# Patient Record
Sex: MALE | Race: WHITE | Employment: FULL TIME | URBAN - METROPOLITAN AREA
[De-identification: names, ages, dates, MRNs, and addresses within clinical notes are randomized per-mention and may not be internally consistent; named-entity substitution may affect disease eponyms.]

---

## 2017-06-14 ENCOUNTER — OFFICE VISIT (OUTPATIENT)
Dept: FAMILY MEDICINE | Facility: CLINIC | Age: 52
End: 2017-06-14
Payer: COMMERCIAL

## 2017-06-14 VITALS
BODY MASS INDEX: 23.84 KG/M2 | TEMPERATURE: 97.8 F | RESPIRATION RATE: 16 BRPM | DIASTOLIC BLOOD PRESSURE: 62 MMHG | SYSTOLIC BLOOD PRESSURE: 128 MMHG | WEIGHT: 166.56 LBS | HEART RATE: 89 BPM | HEIGHT: 70 IN | OXYGEN SATURATION: 97 %

## 2017-06-14 DIAGNOSIS — G43.109 MIGRAINE WITH AURA AND WITHOUT STATUS MIGRAINOSUS, NOT INTRACTABLE: Primary | ICD-10-CM

## 2017-06-14 DIAGNOSIS — L71.9 ROSACEA: ICD-10-CM

## 2017-06-14 DIAGNOSIS — G47.9 SLEEP TROUBLE: ICD-10-CM

## 2017-06-14 PROCEDURE — 99214 OFFICE O/P EST MOD 30 MIN: CPT | Performed by: PHYSICIAN ASSISTANT

## 2017-06-14 RX ORDER — ZOLPIDEM TARTRATE 5 MG/1
5 TABLET ORAL
Qty: 14 TABLET | Refills: 0 | Status: SHIPPED | OUTPATIENT
Start: 2017-06-14 | End: 2017-09-19

## 2017-06-14 RX ORDER — MINOCYCLINE HYDROCHLORIDE 50 MG/1
50 CAPSULE ORAL 2 TIMES DAILY
Qty: 180 CAPSULE | Refills: 1 | Status: SHIPPED | OUTPATIENT
Start: 2017-06-14 | End: 2018-06-29

## 2017-06-14 RX ORDER — RIZATRIPTAN BENZOATE 10 MG/1
10 TABLET ORAL
Qty: 18 TABLET | Refills: 0 | Status: SHIPPED | OUTPATIENT
Start: 2017-06-14 | End: 2017-09-19

## 2017-06-14 NOTE — MR AVS SNAPSHOT
"              After Visit Summary   2017    Lalit Salinas    MRN: [id number]           Patient Information     Date Of Birth          1965        Visit Information        Provider Department      2017 2:30 PM Rosalva Mccall PA-C [facility] [place]        Today's Diagnoses     Migraine with aura and without status migrainosus, not intractable    -  1    Sleep trouble           Follow-ups after your visit        Who to contact     If you have questions or need follow up information about today's clinic visit or your schedule please contact [facility] directly at [phone].  Normal or non-critical lab and imaging results will be communicated to you by [employer], letter or phone within 4 business days after the clinic has received the results. If you do not hear from us within 7 days, please contact the clinic through [employer] or phone. If you have a critical or abnormal lab result, we will notify you by phone as soon as possible.  Submit refill requests through [employer] or call your pharmacy and they will forward the refill request to us. Please allow 3 business days for your refill to be completed.          Additional Information About Your Visit        MyChart Information     [employer] lets you send messages to your doctor, view your test results, renew your prescriptions, schedule appointments and more. To sign up, go to [email or web address] . Click on \"Log in\" on the left side of the screen, which will take you to the Welcome page. Then click on \"Sign up Now\" on the right side of the page.     You will be asked to enter the access code listed below, as well as some personal information. Please follow the directions to create your username and password.     Your access code is: [email or web address]  Expires: 2017  2:46 PM     Your access code will  in 90 days. If you need help or a new code, please call your [facility] or [phone].        Care " "EveryWhere ID     This is your Care EveryWhere ID. This could be used by other organizations to access your [facility] medical records  [id number]        Your Vitals Were     Pulse Temperature Respirations Height Pulse Oximetry BMI (Body Mass Index)    89 97.8  F (36.6  C) (Tympanic) 16 5' 9.5\" (1.765 m) 97% 24.24 kg/m2       Blood Pressure from Last 3 Encounters:   06/14/17 128/62   05/06/16 116/80   04/11/16 110/74    Weight from Last 3 Encounters:   06/14/17 166 lb 9 oz (75.6 kg)   05/06/16 167 lb 14.4 oz (76.2 kg)   04/11/16 166 lb 12.8 oz (75.7 kg)              We Performed the Following     MIGRAINE ACTION PLAN          Today's Medication Changes          These changes are accurate as of: 6/14/17  2:46 PM.  If you have any questions, ask your nurse or doctor.               Start taking these medicines.        Dose/Directions    rizatriptan 10 MG tablet   Commonly known as:  MAXALT   Used for:  Migraine with aura and without status migrainosus, not intractable   Started by:  Rosalva Mccall PA-C        Dose:  10 mg   Take 1 tablet (10 mg) by mouth at onset of headache for migraine May repeat in 2 hours. Max 3 tablets/24 hours.   Quantity:  18 tablet   Refills:  0       zolpidem 5 MG tablet   Commonly known as:  AMBIEN   Used for:  Sleep trouble   Started by:  Rosalva Mccall PA-C        Dose:  5 mg   Take 1 tablet (5 mg) by mouth nightly as needed for sleep   Quantity:  14 tablet   Refills:  0         Stop taking these medicines if you haven't already. Please contact your care team if you have questions.     SUMAtriptan 50 MG tablet   Commonly known as:  IMITREX   Stopped by:  Rosalva Mccall PA-C                Where to get your medicines      These medications were sent to [facility] [address] [place], [place] - [address] [facility] AT [facility] [address] & [facility]  [id number] [place] [address] [facility] [address]     Phone:  [phone]     rizatriptan 10 MG tablet       "   Some of these will need a paper prescription and others can be bought over the counter.  Ask your nurse if you have questions.     Bring a paper prescription for each of these medications     zolpidem 5 MG tablet                Primary Care Provider Office Phone # Fax #    Jas Palacios PA-C [phone]       [facility] [address]  [facility] [address]        Thank you!     Thank you for choosing [facility]  for your care. Our goal is always to provide you with excellent care. Hearing back from our patients is one way we can continue to improve our services. Please take a few minutes to complete the written survey that you may receive in the mail after your visit with us. Thank you!             Your Updated Medication List - Protect others around you: Learn how to safely use, store and throw away your medicines at www.disposemymeds.org.          This list is accurate as of: 6/14/17  2:46 PM.  Always use your most recent med list.                   Brand Name Dispense Instructions for use    minocycline 50 MG capsule    MINOCIN/DYNACIN    180 capsule    Take 1 capsule (50 mg) by mouth 2 times daily       rizatriptan 10 MG tablet    MAXALT    18 tablet    Take 1 tablet (10 mg) by mouth at onset of headache for migraine May repeat in 2 hours. Max 3 tablets/24 hours.       zolpidem 5 MG tablet    AMBIEN    14 tablet    Take 1 tablet (5 mg) by mouth nightly as needed for sleep

## 2017-06-14 NOTE — PROGRESS NOTES
SUBJECTIVE:                                                    Lalit Salinas is a 51 year old male who presents to clinic today for the following health issues:      Migraine     Headaches symptoms:  Worsened     Frequency: 1-2 Times Per Month     Duration of headaches: 3-4 Days    Able to do normal daily activities/work with migraines: Yes    Rescue/Relief medication:Imitrex              Effectiveness: no relief    Preventative medication: None    Neurologic complications: Blurred Vision Left Side    In the past 4 weeks, how often have you gone to Urgent Care or the emergency room because of your headaches?  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  Additional history: as documented    Patient Active Problem List   Diagnosis     CARDIOVASCULAR SCREENING; LDL GOAL LESS THAN 160     Migraine with aura and without status migrainosus, not intractable     Rosacea     History reviewed. No pertinent surgical history.    Social History   Substance Use Topics     Smoking status: Former Smoker     Smokeless tobacco: Never Used     Alcohol use Yes      Comment: occ     Family History   Problem Relation Age of Onset     Hypertension Mother      Other Cancer Father          Current Outpatient Prescriptions   Medication Sig Dispense Refill     rizatriptan (MAXALT) 10 MG tablet Take 1 tablet (10 mg) by mouth at onset of headache for migraine May repeat in 2 hours. Max 3 tablets/24 hours. 18 tablet 0     zolpidem (AMBIEN) 5 MG tablet Take 1 tablet (5 mg) by mouth nightly as  "needed for sleep 14 tablet 0     minocycline (MINOCIN/DYNACIN) 50 MG capsule Take 1 capsule (50 mg) by mouth 2 times daily 180 capsule 1     No Known Allergies    Reviewed and updated as needed this visit by clinical staff       Reviewed and updated as needed this visit by Provider         ROS:  Constitutional, HEENT, cardiovascular, pulmonary, gi and gu systems are negative, except as otherwise noted.    OBJECTIVE:                                                    /62 (BP Location: Right arm, Patient Position: Chair, Cuff Size: Adult Large)  Pulse 89  Temp 97.8  F (36.6  C) (Tympanic)  Resp 16  Ht 5' 9.5\" (1.765 m)  Wt 166 lb 9 oz (75.6 kg)  SpO2 97%  BMI 24.24 kg/m2  Body mass index is 24.24 kg/(m^2).  GENERAL: healthy, alert and no distress  EYES: Eyes grossly normal to inspection, PERRL and conjunctivae and sclerae normal  HENT: ear canals and TM's normal, nose and mouth without ulcers or lesions  NECK: no adenopathy, no asymmetry, masses, or scars and thyroid normal to palpation  RESP: lungs clear to auscultation - no rales, rhonchi or wheezes  CV: regular rate and rhythm, normal S1 S2, no S3 or S4, no murmur, click or rub, no peripheral edema and peripheral pulses strong  MS: no gross musculoskeletal defects noted, no edema    Diagnostic Test Results:  none      ASSESSMENT/PLAN:                                                            1. Migraine with aura and without status migrainosus, not intractable  Chronic issue, with recent headache, minimal improvement with Imitrex and has s/e to Imitrex. Will change to Maxalt to see if improves symptoms quicker with less s/e.  MAP given today. F/U if symptoms worsen or do not improve.  - MIGRAINE ACTION PLAN  - rizatriptan (MAXALT) 10 MG tablet; Take 1 tablet (10 mg) by mouth at onset of headache for migraine May repeat in 2 hours. Max 3 tablets/24 hours.  Dispense: 18 tablet; Refill: 0    2. Sleep trouble  New concern, since he thinks the lack of " sleep may trigger migraines, a few Ambien given to help him transition sleep.  - zolpidem (AMBIEN) 5 MG tablet; Take 1 tablet (5 mg) by mouth nightly as needed for sleep  Dispense: 14 tablet; Refill: 0    3. Rosacea  Chronic issue, refilled today.  - minocycline (MINOCIN/DYNACIN) 50 MG capsule; Take 1 capsule (50 mg) by mouth 2 times daily  Dispense: 180 capsule; Refill: 1    Risks, benefits and alternatives were discussed with patient. Agreeable to the plan of care.      Rosalva Mccall PA-C  [facility]

## 2017-06-14 NOTE — NURSING NOTE
"Chief Complaint   Patient presents with     Headache       Initial /62 (BP Location: Right arm, Patient Position: Chair, Cuff Size: Adult Large)  Pulse 89  Temp 97.8  F (36.6  C) (Tympanic)  Resp 16  Ht 5' 9.5\" (1.765 m)  Wt 166 lb 9 oz (75.6 kg)  SpO2 97%  BMI 24.24 kg/m2 Estimated body mass index is 24.24 kg/(m^2) as calculated from the following:    Height as of this encounter: 5' 9.5\" (1.765 m).    Weight as of this encounter: 166 lb 9 oz (75.6 kg).  Medication Reconciliation: complete     Patient would like to be notified at the following phone number for results from this visit   [phone] OK to leave message   Tyra Nunez CMA (AAMA) 6/14/2017 2:34 PM      "

## 2017-06-14 NOTE — LETTER
[facility]  [address] [facility] [address]  Phone: [phone]    June 14, 2017        Lalit Salinas  [address]  [facility] [address]          To whom it may concern:    RE: Lalit Salinas    Patient was seen and treated today at our clinic and missed work.    Please contact me for questions or concerns.      Sincerely,        Rosalva Mccall PA-C

## 2017-06-14 NOTE — LETTER
My Migraine Action Plan      Date: 6/14/2017     My Name: Lalit Salinas   YOB: 1965  My Pharmacy: [facility] [address] [facility] [address] [facility] AT [place] [address] & [facility]       My (Preventative) Control Medicine: none        My Rescue Medicine: Maxalt   My Doctor: Jas Palacios     My Clinic: [facility]  [address] [facility] [address] [phone]        GREEN ZONE = Good Control    My headache plan is working.   I can do what I need to do.           I WILL:     ? Keep managing my triggers.  ? Write in my migraine diary each time I have a headache.  ? Keep taking my preventive (controller) medicine daily.  ? Take my relief and rescue medicine as needed.             YELLOW ZONE = Not Enough Control    My headache plan isn t always working.   My headaches keep me from doing   some of the things I need to do.       I WILL:     ? Set goals to control my triggers and act on them.  ? Write in my migraine diary each time I have a headache and review it for                      patterns or new triggers.  ? Keep taking my preventive (controller) medicine daily.  ? Take my relief and rescue medicine as needed.  ? Call my doctor or clinic at if I stay in the Yellow Zone.             RED ZONE = Poor or No Control    My headache plan has  failed. I can t do anything  when I have one. My  medicines aren t working.           I WILL:   ? Set goals to control my triggers and act on them.  ? Write in my migraine diary each time I have a headache and review it for                      patterns or new triggers.  ? Keep taking my preventive (controller) medicine daily.  ? Take my relief and rescue medicine as needed.  ? Call my doctor or clinic or go to urgent care or an ER if I m having the worst                  headache of my life.  ? Call my doctor or clinic or go to urgent care or an ER if my medicine doesn t work.  ? Let my doctor or clinic  know within 2 weeks if I have gone to an urgent care or             emergency department.          Provider specific instructions:

## 2017-06-28 ENCOUNTER — OFFICE VISIT (OUTPATIENT)
Dept: FAMILY MEDICINE | Facility: CLINIC | Age: 52
End: 2017-06-28
Payer: COMMERCIAL

## 2017-06-28 VITALS
HEIGHT: 70 IN | DIASTOLIC BLOOD PRESSURE: 78 MMHG | HEART RATE: 73 BPM | RESPIRATION RATE: 18 BRPM | TEMPERATURE: 97.7 F | WEIGHT: 171.19 LBS | BODY MASS INDEX: 24.51 KG/M2 | SYSTOLIC BLOOD PRESSURE: 110 MMHG | OXYGEN SATURATION: 98 %

## 2017-06-28 DIAGNOSIS — G43.109 MIGRAINE WITH AURA AND WITHOUT STATUS MIGRAINOSUS, NOT INTRACTABLE: Primary | ICD-10-CM

## 2017-06-28 PROCEDURE — 99213 OFFICE O/P EST LOW 20 MIN: CPT | Performed by: PHYSICIAN ASSISTANT

## 2017-06-28 RX ORDER — ONDANSETRON 4 MG/1
4-8 TABLET, ORALLY DISINTEGRATING ORAL
Qty: 20 TABLET | Refills: 1 | Status: SHIPPED | OUTPATIENT
Start: 2017-06-28 | End: 2018-11-21

## 2017-06-28 NOTE — MR AVS SNAPSHOT
"              After Visit Summary   2017    Lalit Salinas    MRN: [id number]           Patient Information     Date Of Birth          1965        Visit Information        Provider Department      2017 2:50 PM Rosalva Mccall PA-C [facility]        Today's Diagnoses     Migraine with aura and without status migrainosus, not intractable    -  1       Follow-ups after your visit        Who to contact     If you have questions or need follow up information about today's clinic visit or your schedule please contact [facility] directly at [phone].  Normal or non-critical lab and imaging results will be communicated to you by Gust[employer], letter or phone within 4 business days after the clinic has received the results. If you do not hear from us within 7 days, please contact the clinic through [employer] or phone. If you have a critical or abnormal lab result, we will notify you by phone as soon as possible.  Submit refill requests through [employer] or call your pharmacy and they will forward the refill request to us. Please allow 3 business days for your refill to be completed.          Additional Information About Your Visit        MyChart Information     [employer] lets you send messages to your doctor, view your test results, renew your prescriptions, schedule appointments and more. To sign up, go to [email or web address] . Click on \"Log in\" on the left side of the screen, which will take you to the Welcome page. Then click on \"Sign up Now\" on the right side of the page.     You will be asked to enter the access code listed below, as well as some personal information. Please follow the directions to create your username and password.     Your access code is: [email or web address]  Expires: 2017  2:46 PM     Your access code will  in 90 days. If you need help or a new code, please call your [facility] or [phone].        Care EveryWhere ID     This is " "your Care EveryWhere ID. This could be used by other organizations to access your [facility] medical records  [id number]        Your Vitals Were     Pulse Temperature Respirations Height Pulse Oximetry BMI (Body Mass Index)    73 97.7  F (36.5  C) (Tympanic) 18 5' 9.5\" (1.765 m) 98% 24.92 kg/m2       Blood Pressure from Last 3 Encounters:   06/28/17 110/78   06/14/17 128/62   05/06/16 116/80    Weight from Last 3 Encounters:   06/28/17 171 lb 3 oz (77.7 kg)   06/14/17 166 lb 9 oz (75.6 kg)   05/06/16 167 lb 14.4 oz (76.2 kg)              Today, you had the following     No orders found for display         Today's Medication Changes          These changes are accurate as of: 6/28/17  3:02 PM.  If you have any questions, ask your nurse or doctor.               Start taking these medicines.        Dose/Directions    ondansetron 4 MG ODT tab   Commonly known as:  ZOFRAN ODT   Used for:  Migraine with aura and without status migrainosus, not intractable   Started by:  Rosalva Mccall PA-C        Dose:  4-8 mg   Take 1-2 tablets (4-8 mg) by mouth 3 times daily (before meals)   Quantity:  20 tablet   Refills:  1            Where to get your medicines      These medications were sent to [facility] [address] [facility] [address] [facility] AT [place] [address] & [facility]  [address] [facility] [address]     Phone:  [phone]     ondansetron 4 MG ODT tab                Primary Care Provider Office Phone # Fax #    Jas Palacios PA-C [phone]       [facility] [address]  [facility] [address]        Equal Access to Services     ELLEN GARRETT AH: Elaina prideo Sokateali, waaxda luqadaha, qaybta kaalmada adeegyada, jj hu. So [facility] [phone].    ATENCIÓN: Si habla español, tiene a monge disposición servicios gratuitos de asistencia lingüística. Llame al [phone].    We comply with applicable federal " civil rights laws and [place] laws. We do not discriminate on the basis of race, color, national origin, age, disability sex, sexual orientation or gender identity.            Thank you!     Thank you for choosing [facility] [place]  for your care. Our goal is always to provide you with excellent care. Hearing back from our patients is one way we can continue to improve our services. Please take a few minutes to complete the written survey that you may receive in the mail after your visit with us. Thank you!             Your Updated Medication List - Protect others around you: Learn how to safely use, store and throw away your medicines at www.disposemymeds.org.          This list is accurate as of: 6/28/17  3:02 PM.  Always use your most recent med list.                   Brand Name Dispense Instructions for use Diagnosis    minocycline 50 MG capsule    MINOCIN/DYNACIN    180 capsule    Take 1 capsule (50 mg) by mouth 2 times daily    Rosacea       ondansetron 4 MG ODT tab    ZOFRAN ODT    20 tablet    Take 1-2 tablets (4-8 mg) by mouth 3 times daily (before meals)    Migraine with aura and without status migrainosus, not intractable       rizatriptan 10 MG tablet    MAXALT    18 tablet    Take 1 tablet (10 mg) by mouth at onset of headache for migraine May repeat in 2 hours. Max 3 tablets/24 hours.    Migraine with aura and without status migrainosus, not intractable       zolpidem 5 MG tablet    AMBIEN    14 tablet    Take 1 tablet (5 mg) by mouth nightly as needed for sleep    Sleep trouble

## 2017-06-28 NOTE — PROGRESS NOTES
SUBJECTIVE:                                                    Lalit Salinas is a 51 year old male who presents to clinic today for the following health issues:    Pt needs FMLA paperwork completed.   Patient needs this filled out in regards to recent migraine headache that he was seen for 2 weeks ago, missed a few days of work.   Would also like this in place in case he misses future work for the migraines.  Notes that the Maxalt seems to work, but still has a lot of nausea with the headaches.    Problem list and histories reviewed & adjusted, as indicated.  Additional history: as documented    Patient Active Problem List   Diagnosis     CARDIOVASCULAR SCREENING; LDL GOAL LESS THAN 160     Migraine with aura and without status migrainosus, not intractable     Rosacea     History reviewed. No pertinent surgical history.    Social History   Substance Use Topics     Smoking status: Former Smoker     Smokeless tobacco: Never Used     Alcohol use Yes      Comment: occ     Family History   Problem Relation Age of Onset     Hypertension Mother      Other Cancer Father          Current Outpatient Prescriptions   Medication Sig Dispense Refill     ondansetron (ZOFRAN ODT) 4 MG ODT tab Take 1-2 tablets (4-8 mg) by mouth 3 times daily (before meals) 20 tablet 1     rizatriptan (MAXALT) 10 MG tablet Take 1 tablet (10 mg) by mouth at onset of headache for migraine May repeat in 2 hours. Max 3 tablets/24 hours. 18 tablet 0     zolpidem (AMBIEN) 5 MG tablet Take 1 tablet (5 mg) by mouth nightly as needed for sleep 14 tablet 0     minocycline (MINOCIN/DYNACIN) 50 MG capsule Take 1 capsule (50 mg) by mouth 2 times daily 180 capsule 1     No Known Allergies    Reviewed and updated as needed this visit by clinical staff       Reviewed and updated as needed this visit by Provider         ROS:  Constitutional, HEENT, cardiovascular, pulmonary, gi and gu systems are negative, except as otherwise noted.    OBJECTIVE:     /78  "(BP Location: Right arm, Patient Position: Chair, Cuff Size: Adult Regular)  Pulse 73  Temp 97.7  F (36.5  C) (Tympanic)  Resp 18  Ht 5' 9.5\" (1.765 m)  Wt 171 lb 3 oz (77.7 kg)  SpO2 98%  BMI 24.92 kg/m2  Body mass index is 24.92 kg/(m^2).  GENERAL: healthy, alert and no distress  NECK: no adenopathy, no asymmetry, masses, or scars and thyroid normal to palpation  RESP: lungs clear to auscultation - no rales, rhonchi or wheezes  CV: regular rate and rhythm, normal S1 S2, no S3 or S4, no murmur, click or rub, no peripheral edema and peripheral pulses strong  MS: no gross musculoskeletal defects noted, no edema    Diagnostic Test Results:  none     ASSESSMENT/PLAN:             1. Migraine with aura and without status migrainosus, not intractable  Chronic issue, will trial the Zofran for the nausea associated with the headaches.  Did fill out the [facility] paperwork, copied and scanned to chart.  - ondansetron (ZOFRAN ODT) 4 MG ODT tab; Take 1-2 tablets (4-8 mg) by mouth 3 times daily (before meals)  Dispense: 20 tablet; Refill: 1    Risks, benefits and alternatives were discussed with patient. Agreeable to the plan of care.      Rosalva Mccall PA-C  [facility]  "

## 2017-06-28 NOTE — NURSING NOTE
"Chief Complaint   Patient presents with     Forms       Initial /78 (BP Location: Right arm, Patient Position: Chair, Cuff Size: Adult Regular)  Pulse 73  Temp 97.7  F (36.5  C) (Tympanic)  Resp 18  Ht 5' 9.5\" (1.765 m)  Wt 171 lb 3 oz (77.7 kg)  SpO2 98%  BMI 24.92 kg/m2 Estimated body mass index is 24.92 kg/(m^2) as calculated from the following:    Height as of this encounter: 5' 9.5\" (1.765 m).    Weight as of this encounter: 171 lb 3 oz (77.7 kg).  Medication Reconciliation: complete     Alana Hung CMA      "

## 2017-07-12 ENCOUNTER — TELEPHONE (OUTPATIENT)
Dept: FAMILY MEDICINE | Facility: CLINIC | Age: 52
End: 2017-07-12

## 2017-07-12 NOTE — TELEPHONE ENCOUNTER
Faxed 3M disability forms back to  Disability [employer] Fax# [phone]  Martha Garnett MA 7/12/2017 10:08 AM

## 2017-07-12 NOTE — TELEPHONE ENCOUNTER
Faxed signed orders back to  homecare and hospice Fax# [phone]  Martha Garnett MA 7/12/2017 10:06 AM

## 2017-09-08 ENCOUNTER — TELEPHONE (OUTPATIENT)
Dept: FAMILY MEDICINE | Facility: CLINIC | Age: 52
End: 2017-09-08

## 2017-09-08 DIAGNOSIS — Z12.11 SCREEN FOR COLON CANCER: Primary | ICD-10-CM

## 2017-09-08 NOTE — TELEPHONE ENCOUNTER
Panel Management Review      Patient has the following on his problem list: None      Composite cancer screening  Chart review shows that this patient is due/due soon for the following Fecal Colorectal (FIT)  Summary:    Patient is due/failing the following:   FIT    Action needed:   Patient needs referral/order: FIT    Type of outreach:    Phone, spoke to patient.  He will come  the FIT from the lab.  Order has been placed    Questions for provider review:  None    Amber Pritchett MA       Chart routed to none .

## 2017-09-19 DIAGNOSIS — G47.9 SLEEP TROUBLE: ICD-10-CM

## 2017-09-19 DIAGNOSIS — G43.109 MIGRAINE WITH AURA AND WITHOUT STATUS MIGRAINOSUS, NOT INTRACTABLE: ICD-10-CM

## 2017-09-19 NOTE — TELEPHONE ENCOUNTER
rizatriptan (MAXALT) 10 MG      Last Written Prescription Date: 6/14/17  Last Fill Quantity: 18, # refills: 0  Last Office Visit with [facility] or  [facility] prescribing provider: 6/28/17       BP Readings from Last 3 Encounters:   06/28/17 110/78   06/14/17 128/62   05/06/16 116/80         zolpidem (AMBIEN) 5 MG      Last Written Prescription Date:  6/14/17  Last Fill Quantity: 14,   # refills: 0  Last Office Visit with [facility] or  [facility] prescribing provider: 6/28/17  Future Office visit:       Routing refill request to provider for review/approval because:  Drug not on the [facility], [facility] or  [facility] refill protocol or controlled substance

## 2017-09-21 RX ORDER — RIZATRIPTAN BENZOATE 10 MG/1
TABLET ORAL
Qty: 18 TABLET | Refills: 3 | Status: SHIPPED | OUTPATIENT
Start: 2017-09-21 | End: 2018-04-18

## 2017-09-21 NOTE — TELEPHONE ENCOUNTER
Maxalt:  Prescription approved per [facility] Refill Protocol.    Ambien:  Routing refill request to provider for review/approval because:  Drug not on the [facility] refill protocol   Merary Brown, RN  Triage Nurse

## 2017-09-22 RX ORDER — ZOLPIDEM TARTRATE 5 MG/1
TABLET ORAL
Qty: 14 TABLET | Refills: 0 | Status: SHIPPED | OUTPATIENT
Start: 2017-09-22 | End: 2018-02-23

## 2017-10-02 ENCOUNTER — TELEPHONE (OUTPATIENT)
Dept: FAMILY MEDICINE | Facility: CLINIC | Age: 52
End: 2017-10-02

## 2017-12-08 ENCOUNTER — TELEPHONE (OUTPATIENT)
Dept: FAMILY MEDICINE | Facility: CLINIC | Age: 52
End: 2017-12-08

## 2017-12-08 NOTE — TELEPHONE ENCOUNTER
Panel Management Review      Patient has the following on his problem list: None      Composite cancer screening  Chart review shows that this patient is due/due soon for the following Fecal Colorectal (FIT)  Summary:    Patient is due/failing the following:   FIT and PHYSICAL    Action needed:   Patient needs office visit for px, FIT.    Type of outreach:    Phone, left message for patient to call back.     Questions for provider review:    None                                                                                                                                Amber Pritchett CMA ([facility])    Chart routed to Care Team.

## 2017-12-18 NOTE — TELEPHONE ENCOUNTER
2nd attempt, LM for pt to call back.  Amber Pritchett CMA ([facility])

## 2018-01-11 ENCOUNTER — TELEPHONE (OUTPATIENT)
Dept: FAMILY MEDICINE | Facility: CLINIC | Age: 53
End: 2018-01-11

## 2018-01-11 NOTE — TELEPHONE ENCOUNTER
1/11/2018    Call Regarding Preventive Health Screening Physical    Attempt 3     Message on voicemail     Comments: Clinic made prior attempt(s)        Outreach   Apple Young

## 2018-02-20 DIAGNOSIS — G47.9 SLEEP TROUBLE: ICD-10-CM

## 2018-02-20 RX ORDER — ZOLPIDEM TARTRATE 5 MG/1
TABLET ORAL
Qty: 14 TABLET | Refills: 0 | Status: CANCELLED | OUTPATIENT
Start: 2018-02-20

## 2018-02-20 NOTE — TELEPHONE ENCOUNTER
zolpidem (AMBIEN) 5 MG tablet      Last Written Prescription Date:  9/22/2017  Last Fill Quantity: 14,   # refills: 0  Last Office Visit: 6/28/2017  Future Office visit:       Routing refill request to provider for review/approval because:  Drug not on the [facility], [facility] or [facility] refill protocol or controlled substance

## 2018-02-22 ENCOUNTER — TELEPHONE (OUTPATIENT)
Dept: FAMILY MEDICINE | Facility: CLINIC | Age: 53
End: 2018-02-22

## 2018-02-22 ENCOUNTER — NURSE TRIAGE (OUTPATIENT)
Dept: NURSING | Facility: CLINIC | Age: 53
End: 2018-02-22

## 2018-02-22 DIAGNOSIS — G47.9 SLEEP TROUBLE: ICD-10-CM

## 2018-02-22 NOTE — TELEPHONE ENCOUNTER
"Clinic Action Needed: YES. Please follow up on this refill and call patient in am 2/23  Reason for Call:\"I am still waiting on a refill for    Disp Refills Start End MOHAMUD  zolpidem (AMBIEN) 5 MG tablet 14 tablet 0 9/22/2017  No  Sig: TAKE 1 TABLET BY MOUTH NIGHTLY AS NEEDED FOR SLEEP    RX still pending per epic.    Patient Recommendations/Teaching:Will route request to MD  Routed to:MD Tammie العراقي RN [facility]          "

## 2018-02-22 NOTE — TELEPHONE ENCOUNTER
"  Reason for Disposition    Caller has NON-URGENT medication question about med that PCP prescribed and triager unable to answer question     \"I am still waiting on a refill for    Disp Refills Start End MOHAMUD  zolpidem (AMBIEN) 5 MG tablet 14 tablet 0 9/22/2017  No  Sig: TAKE 1 TABLET BY MOUTH NIGHTLY AS NEEDED FOR SLEEP    RX still pending per epic.    Additional Information    Negative: Drug overdose and nurse unable to answer question    Negative: Caller requesting information not related to medicine    Negative: Caller requesting a prescription for Strep throat and has a positive culture result    Negative: Rash while taking a medication or within 3 days of stopping it    Negative: Immunization reaction suspected    Negative: [1] Asthma and [2] having symptoms of asthma (cough, wheezing, etc)    Negative: MORE THAN A DOUBLE DOSE of a prescription or over-the-counter (OTC) drug    Negative: [1] DOUBLE DOSE (an extra dose or lesser amount) of over-the-counter (OTC) drug AND [2] any symptoms (e.g., dizziness, nausea, pain, sleepiness)    Negative: [1] DOUBLE DOSE (an extra dose or lesser amount) of prescription drug AND [2] any symptoms (e.g., dizziness, nausea, pain, sleepiness)    Negative: Took another person's prescription drug    Negative: [1] DOUBLE DOSE (an extra dose or lesser amount) of prescription drug AND [2] NO symptoms (Exception: a double dose of antibiotics)    Negative: Diabetes drug error or overdose (e.g., insulin or extra dose)    Negative: [1] Request for URGENT new prescription or refill of \"essential\" medication (i.e., likelihood of harm to patient if not taken) AND [2] triager unable to fill per unit policy    Negative: [1] Prescription not at pharmacy AND [2] was prescribed today by PCP    Negative: Pharmacy calling with prescription questions and triager unable to answer question    Negative: Caller has URGENT medication question about med that PCP prescribed and triager unable to answer " question    Protocols used: MEDICATION QUESTION CALL-ADULT-AH

## 2018-02-23 RX ORDER — ZOLPIDEM TARTRATE 5 MG/1
5 TABLET ORAL
Qty: 14 TABLET | Refills: 0 | Status: SHIPPED | OUTPATIENT
Start: 2018-02-23 | End: 2018-04-18

## 2018-02-23 NOTE — TELEPHONE ENCOUNTER
Routing refill request to provider for review/approval because:  Drug not on the [facility] refill protocol   Merary Brown, RN  Triage Nurse

## 2018-03-20 ENCOUNTER — TELEPHONE (OUTPATIENT)
Dept: FAMILY MEDICINE | Facility: CLINIC | Age: 53
End: 2018-03-20

## 2018-03-20 NOTE — TELEPHONE ENCOUNTER
Panel Management Review      Patient has the following on his problem list: None      Composite cancer screening  Chart review shows that this patient is due/due soon for the following Fecal Colorectal (FIT)  Summary:    Patient is due/failing the following:   FIT, physical    Action needed:   Patient needs office visit for physical,  FIT test.    Type of outreach:    Phone, left message for patient to call back.     Questions for provider review:    None                                                                                                                                 Amber Pritchett CMA ([facility])     Chart routed to Care Team.

## 2018-04-18 ENCOUNTER — OFFICE VISIT (OUTPATIENT)
Dept: FAMILY MEDICINE | Facility: CLINIC | Age: 53
End: 2018-04-18
Payer: COMMERCIAL

## 2018-04-18 VITALS
HEART RATE: 77 BPM | OXYGEN SATURATION: 99 % | TEMPERATURE: 98.2 F | SYSTOLIC BLOOD PRESSURE: 136 MMHG | BODY MASS INDEX: 24.44 KG/M2 | HEIGHT: 70 IN | RESPIRATION RATE: 16 BRPM | WEIGHT: 170.7 LBS | DIASTOLIC BLOOD PRESSURE: 82 MMHG

## 2018-04-18 DIAGNOSIS — G43.109 MIGRAINE WITH AURA AND WITHOUT STATUS MIGRAINOSUS, NOT INTRACTABLE: Primary | ICD-10-CM

## 2018-04-18 DIAGNOSIS — G47.9 SLEEP TROUBLE: ICD-10-CM

## 2018-04-18 DIAGNOSIS — Z12.11 SCREEN FOR COLON CANCER: ICD-10-CM

## 2018-04-18 DIAGNOSIS — F41.9 ANXIETY: ICD-10-CM

## 2018-04-18 PROCEDURE — 99214 OFFICE O/P EST MOD 30 MIN: CPT | Performed by: PHYSICIAN ASSISTANT

## 2018-04-18 RX ORDER — SUMATRIPTAN 50 MG/1
50 TABLET, FILM COATED ORAL
Qty: 9 TABLET | Refills: 1 | Status: SHIPPED | OUTPATIENT
Start: 2018-04-18

## 2018-04-18 RX ORDER — HYDROXYZINE PAMOATE 25 MG/1
25 CAPSULE ORAL 3 TIMES DAILY PRN
Qty: 30 CAPSULE | Refills: 0 | Status: SHIPPED | OUTPATIENT
Start: 2018-04-18 | End: 2018-06-29

## 2018-04-18 RX ORDER — ZOLPIDEM TARTRATE 5 MG/1
5 TABLET ORAL
Qty: 30 TABLET | Refills: 1 | Status: SHIPPED | OUTPATIENT
Start: 2018-04-18 | End: 2018-11-21

## 2018-04-18 ASSESSMENT — ANXIETY QUESTIONNAIRES
7. FEELING AFRAID AS IF SOMETHING AWFUL MIGHT HAPPEN: SEVERAL DAYS
IF YOU CHECKED OFF ANY PROBLEMS ON THIS QUESTIONNAIRE, HOW DIFFICULT HAVE THESE PROBLEMS MADE IT FOR YOU TO DO YOUR WORK, TAKE CARE OF THINGS AT HOME, OR GET ALONG WITH OTHER PEOPLE: SOMEWHAT DIFFICULT
1. FEELING NERVOUS, ANXIOUS, OR ON EDGE: MORE THAN HALF THE DAYS
3. WORRYING TOO MUCH ABOUT DIFFERENT THINGS: SEVERAL DAYS
GAD7 TOTAL SCORE: 10
2. NOT BEING ABLE TO STOP OR CONTROL WORRYING: SEVERAL DAYS
6. BECOMING EASILY ANNOYED OR IRRITABLE: MORE THAN HALF THE DAYS
5. BEING SO RESTLESS THAT IT IS HARD TO SIT STILL: SEVERAL DAYS

## 2018-04-18 ASSESSMENT — PATIENT HEALTH QUESTIONNAIRE - PHQ9: 5. POOR APPETITE OR OVEREATING: MORE THAN HALF THE DAYS

## 2018-04-18 NOTE — MR AVS SNAPSHOT
"              After Visit Summary   4/18/2018    Lalit Salinas    MRN: [id number]           Patient Information     Date Of Birth          1965        Visit Information        Provider Department      4/18/2018 3:10 PM Rosalva Mccall PA-C [facility]        Today's Diagnoses     Encounter for immunization    -  1    Migraine with aura and without status migrainosus, not intractable        Screen for colon cancer        Sleep trouble        Anxiety           Follow-ups after your visit        Follow-up notes from your care team     Return for Med Recheck.      Future tests that were ordered for you today     Open Future Orders        Priority Expected Expires Ordered    Fecal colorectal cancer screen FIT - Future (S+30) Routine 5/9/2018 5/18/2018 4/18/2018            Who to contact     If you have questions or need follow up information about today's clinic visit or your schedule please contact [facility] directly at [phone].  Normal or non-critical lab and imaging results will be communicated to you by MyChart, letter or phone within 4 business days after the clinic has received the results. If you do not hear from us within 7 days, please contact the clinic through [employer] or phone. If you have a critical or abnormal lab result, we will notify you by phone as soon as possible.  Submit refill requests through [employer] or call your pharmacy and they will forward the refill request to us. Please allow 3 business days for your refill to be completed.          Additional Information About Your Visit        [employer] Information     [employer] lets you send messages to your doctor, view your test results, renew your prescriptions, schedule appointments and more. To sign up, go to [email or web address] . Click on \"Log in\" on the left side of the screen, which will take you to the Welcome page. Then click on \"Sign up Now\" on the right side of the page.     You will be " "asked to enter the access code listed below, as well as some personal information. Please follow the directions to create your username and password.     Your access code is: [email or web address]  Expires: 2018  3:34 PM     Your access code will  in 90 days. If you need help or a new code, please call your [facility] clinic or [phone].        Care EveryWhere ID     This is your Care EveryWhere ID. This could be used by other organizations to access your [facility] medical records  [id number]        Your Vitals Were     Pulse Temperature Respirations Height Pulse Oximetry BMI (Body Mass Index)    77 98.2  F (36.8  C) (Oral) 16 5' 9.5\" (1.765 m) 99% 24.85 kg/m2       Blood Pressure from Last 3 Encounters:   18 136/82   17 110/78   17 128/62    Weight from Last 3 Encounters:   18 170 lb 11.2 oz (77.4 kg)   17 171 lb 3 oz (77.7 kg)   17 166 lb 9 oz (75.6 kg)                 Today's Medication Changes          These changes are accurate as of 18  3:34 PM.  If you have any questions, ask your nurse or doctor.               Start taking these medicines.        Dose/Directions    hydrOXYzine 25 MG capsule   Commonly known as:  VISTARIL   Used for:  Anxiety   Started by:  Rosalva Mccall PA-C        Dose:  25 mg   Take 1 capsule (25 mg) by mouth 3 times daily as needed for anxiety   Quantity:  30 capsule   Refills:  0       SUMAtriptan 50 MG tablet   Commonly known as:  IMITREX   Used for:  Migraine with aura and without status migrainosus, not intractable   Started by:  Rosalva Mccall PA-C        Dose:  50 mg   Take 1 tablet (50 mg) by mouth at onset of headache for migraine May repeat in 2 hours. Max 4 tablets/24 hours.   Quantity:  9 tablet   Refills:  1         Stop taking these medicines if you haven't already. Please contact your care team if you have questions.     rizatriptan 10 MG tablet   Commonly known as:  MAXALT   Stopped by:  Rosalva Mccall" DYLON Taylor                Where to get your medicines      These medications were sent to [id number] - [place], [place] - [address] [facility]  [address] [place] [address]     Phone:  [phone]     hydrOXYzine 25 MG capsule    SUMAtriptan 50 MG tablet         Some of these will need a paper prescription and others can be bought over the counter.  Ask your nurse if you have questions.     Bring a paper prescription for each of these medications     zolpidem 5 MG tablet                Primary Care Provider Office Phone # Fax #    Rosalva Mccall PA-C [phone] [address] [address] [facility] [address]        Equal Access to Services     ELLEN GARRETT : Hadii aad ku hadasho Soomaali, waaxda luqadaha, qaybta kaalmada adeegyada, waxay idiin haygirishn fidel francisco . So [facility] [phone].    ATENCIÓN: Si habla español, tiene a monge disposición servicios gratuitos de asistencia lingüística. Llame al [phone].    We comply with applicable federal civil rights laws and [place] laws. We do not discriminate on the basis of race, color, national origin, age, disability, sex, sexual orientation, or gender identity.            Thank you!     Thank you for choosing [facility]  for your care. Our goal is always to provide you with excellent care. Hearing back from our patients is one way we can continue to improve our services. Please take a few minutes to complete the written survey that you may receive in the mail after your visit with us. Thank you!             Your Updated Medication List - Protect others around you: Learn how to safely use, store and throw away your medicines at www.disposemymeds.org.          This list is accurate as of 4/18/18  3:34 PM.  Always use your most recent med list.                   Brand Name Dispense Instructions for use Diagnosis    hydrOXYzine 25 MG capsule    VISTARIL    30  capsule    Take 1 capsule (25 mg) by mouth 3 times daily as needed for anxiety    Anxiety       minocycline 50 MG capsule    MINOCIN/DYNACIN    180 capsule    Take 1 capsule (50 mg) by mouth 2 times daily    Rosacea       ondansetron 4 MG ODT tab    ZOFRAN ODT    20 tablet    Take 1-2 tablets (4-8 mg) by mouth 3 times daily (before meals)    Migraine with aura and without status migrainosus, not intractable       SUMAtriptan 50 MG tablet    IMITREX    9 tablet    Take 1 tablet (50 mg) by mouth at onset of headache for migraine May repeat in 2 hours. Max 4 tablets/24 hours.    Migraine with aura and without status migrainosus, not intractable       zolpidem 5 MG tablet    AMBIEN    30 tablet    Take 1 tablet (5 mg) by mouth nightly as needed    Sleep trouble

## 2018-04-18 NOTE — PROGRESS NOTES
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  Sometimes he can go awhile without issues  Patient thinks the maxalt is maybe becoming less effective, would like to go back on Imitrex    Patient is also needs refill of Ambien  Using intermittently for sleep issues  Does noticed hangover effective    Also reports having some anxiety symptoms, thinking about possible short-term anxiety meds  Feels a bit overwhelmed with some life situations  His son is in legal trouble and all of his sons' family lives at home with them including multiple children  Feels like he has hard time relaxing often, very stressed         Problem list and histories reviewed & adjusted, as indicated.  Additional history: as documented    Patient Active Problem List   Diagnosis     CARDIOVASCULAR SCREENING; LDL GOAL LESS THAN 160     Migraine with aura and without status migrainosus, not intractable     Rosacea     History reviewed. No pertinent surgical history.    Social History   Substance Use Topics     Smoking status: Former Smoker     Smokeless tobacco: Never Used     Alcohol use Yes      Comment: occ     Family History   Problem Relation Age of Onset     Hypertension Mother      Other Cancer Father          Current Outpatient Prescriptions   Medication Sig Dispense Refill     hydrOXYzine (VISTARIL) 25 MG capsule Take 1 capsule (25 mg) by mouth 3 times daily as needed for anxiety 30 capsule 0     minocycline (MINOCIN/DYNACIN) 50 MG capsule Take 1 capsule (50 mg) by mouth 2 times daily 180 capsule 1     ondansetron (ZOFRAN ODT) 4 MG ODT tab Take 1-2 tablets (4-8 mg) by mouth 3 times daily  "(before meals) 20 tablet 1     SUMAtriptan (IMITREX) 50 MG tablet Take 1 tablet (50 mg) by mouth at onset of headache for migraine May repeat in 2 hours. Max 4 tablets/24 hours. 9 tablet 1     zolpidem (AMBIEN) 5 MG tablet Take 1 tablet (5 mg) by mouth nightly as needed 30 tablet 1     No Known Allergies    Reviewed and updated as needed this visit by clinical staff  Tobacco  Allergies  Meds  Med Hx  Surg Hx  Fam Hx  Soc Hx      Reviewed and updated as needed this visit by Provider         ROS:  Constitutional, HEENT, cardiovascular, pulmonary, gi and gu systems are negative, except as otherwise noted.    OBJECTIVE:     /82 (BP Location: Right arm, Patient Position: Chair, Cuff Size: Adult Regular)  Pulse 77  Temp 98.2  F (36.8  C) (Oral)  Resp 16  Ht 5' 9.5\" (1.765 m)  Wt 170 lb 11.2 oz (77.4 kg)  SpO2 99%  BMI 24.85 kg/m2  Body mass index is 24.85 kg/(m^2).  GENERAL: healthy, alert and no distress  NECK: no adenopathy, no asymmetry, masses, or scars and thyroid normal to palpation  RESP: lungs clear to auscultation - no rales, rhonchi or wheezes  CV: regular rate and rhythm, normal S1 S2, no S3 or S4, no murmur, click or rub, no peripheral edema and peripheral pulses strong  MS: no gross musculoskeletal defects noted, no edema    Diagnostic Test Results:  none     ASSESSMENT/PLAN:             1. Migraine with aura and without status migrainosus, not intractable  Chronic issue, will change back to Imitrex.  Discussed if symptoms are worsening in frequency, may consider daily prophylaxis.  - SUMAtriptan (IMITREX) 50 MG tablet; Take 1 tablet (50 mg) by mouth at onset of headache for migraine May repeat in 2 hours. Max 4 tablets/24 hours.  Dispense: 9 tablet; Refill: 1    2. Sleep trouble  Chronic issue, refilled medication.  - zolpidem (AMBIEN) 5 MG tablet; Take 1 tablet (5 mg) by mouth nightly as needed  Dispense: 30 tablet; Refill: 1    3. Anxiety  New problem, will trial Vistaril for prn use. " Advised no driving or working on this. F/U as needed.  - hydrOXYzine (VISTARIL) 25 MG capsule; Take 1 capsule (25 mg) by mouth 3 times daily as needed for anxiety  Dispense: 30 capsule; Refill: 0    4. Screen for colon cancer  - Fecal colorectal cancer screen FIT - Future (S+30); Future          Risks, benefits and alternatives were discussed with patient. Agreeable to the plan of care.      Rosalva Mccall PA-C  [facility]

## 2018-04-19 ASSESSMENT — PATIENT HEALTH QUESTIONNAIRE - PHQ9: SUM OF ALL RESPONSES TO PHQ QUESTIONS 1-9: 7

## 2018-04-19 ASSESSMENT — ANXIETY QUESTIONNAIRES: GAD7 TOTAL SCORE: 10

## 2018-04-23 PROBLEM — F41.9 ANXIETY: Status: ACTIVE | Noted: 2018-04-23

## 2018-06-29 DIAGNOSIS — F41.9 ANXIETY: ICD-10-CM

## 2018-06-29 DIAGNOSIS — L71.9 ROSACEA: ICD-10-CM

## 2018-06-29 RX ORDER — MINOCYCLINE HYDROCHLORIDE 50 MG/1
CAPSULE ORAL
Qty: 180 CAPSULE | Refills: 0 | Status: SHIPPED | OUTPATIENT
Start: 2018-06-29 | End: 2018-11-21

## 2018-06-29 NOTE — TELEPHONE ENCOUNTER
"Requested Prescriptions   Pending Prescriptions Disp Refills     minocycline (MINOCIN/DYNACIN) 50 MG capsule [Pharmacy Med Name: MINOCYCLINE 50MG CAPSULES]  Last Written Prescription Date:  6/14/17  Last Fill Quantity: 180 CAPSULE,  # refills: 1   Last office visit: 4/18/2018 with prescribing provider:  JIN   Future Office Visit:     180 capsule 0     Sig: TAKE 1 CAPSULE(50 MG) BY MOUTH TWICE DAILY    Oral Acne/Rosacea Medications Protocol Failed    6/29/2018  9:46 AM       Failed - Confirmation of diagnosis is required    Please confirm diagnosis is acne or rosacea.     If NOT acne or rosacea; refer request to provider for further evaluation.    If diagnosis IS acne or rosacea, OK to refill BASED ON PREVIOUS REFILL CLINICAL NOTE RECOMMENDATION.         Passed - Patient is 12 years of age or older       Passed - Recent (12 mo) or future (30 days) visit within the authorizing provider's specialty    Patient had office visit in the last 12 months or has a visit in the next 30 days with authorizing provider or within the authorizing provider's specialty.  See \"Patient Info\" tab in inbasket, or \"Choose Columns\" in Meds & Orders section of the refill encounter.            hydrOXYzine (VISTARIL) 25 MG capsule [Pharmacy Med Name: HYDROXYZINE PAMOATE 25MG CAPSULES]  Last Written Prescription Date:  4/18/18  Last Fill Quantity: 30 CAPSULE,  # refills: 0   Last office visit: 4/18/2018 with prescribing provider:  JIN   Future Office Visit:     30 capsule 0     Sig: TAKE 1 CAPSULE(25 MG) BY MOUTH THREE TIMES DAILY AS NEEDED FOR ANXIETY    Antihistamines Protocol Passed    6/29/2018  9:46 AM       Passed - Recent (12 mo) or future (30 days) visit within the authorizing provider's specialty    Patient had office visit in the last 12 months or has a visit in the next 30 days with authorizing provider or within the authorizing provider's specialty.  See \"Patient Info\" tab in inbasket, or \"Choose Columns\" in Meds & " Orders section of the refill encounter.           Passed - Patient is age 3 or older    Apply age and/or weight-based dosing for peds patients age 3 and older.    Forward request to provider for patients under the age of 3.

## 2018-06-29 NOTE — TELEPHONE ENCOUNTER
Minocycline    Prescription approved per [facility] Refill Protocol.    Sivan DAMICO RN, BSN, PHN  [place] [facility] RN

## 2018-06-29 NOTE — TELEPHONE ENCOUNTER
Please call patient, how is the medication working, and issues with the medication?    Rosalva Mccall PA-C

## 2018-07-09 RX ORDER — HYDROXYZINE PAMOATE 25 MG/1
CAPSULE ORAL
Qty: 30 CAPSULE | Refills: 0 | Status: SHIPPED | OUTPATIENT
Start: 2018-07-09 | End: 2018-09-30

## 2018-07-16 ENCOUNTER — TELEPHONE (OUTPATIENT)
Dept: FAMILY MEDICINE | Facility: CLINIC | Age: 53
End: 2018-07-16

## 2018-07-16 NOTE — LETTER
August 1, 2018      Lalit Salinas  [address]  [place] [address]        Dear . Lalit Salinas,      Your health maintenance is very important to us.  According to our records you are due for an annual FIT test.  You should've received one from the clinic while you were here so please complete the test and send it back to us.  If you need another test you can stop by the lab at the clinic.  If you have any questions or concerns please call us at [phone].    Thank you for making [facility] your preferred provider.    Sincerely,  Amber Pritchett CMA (AAMA)

## 2018-07-16 NOTE — TELEPHONE ENCOUNTER
Panel Management Review      Patient has the following on his problem list: None      Composite cancer screening  Chart review shows that this patient is due/due soon for the following Fecal Colorectal (FIT)  Summary:    Patient is due/failing the following:   FIT    Action needed:   Patient needs referral/order: FIT    Type of outreach:    Phone, left message for patient to call back.     Questions for provider review:    None, FIT has been ordered,                                                                                                                               Amber Pritchett CMA (AAMA)     Chart routed to Care Team.

## 2018-09-30 DIAGNOSIS — F41.9 ANXIETY: ICD-10-CM

## 2018-10-02 RX ORDER — HYDROXYZINE PAMOATE 25 MG/1
CAPSULE ORAL
Qty: 30 CAPSULE | Refills: 0 | Status: SHIPPED | OUTPATIENT
Start: 2018-10-02 | End: 2018-11-21

## 2018-10-02 NOTE — TELEPHONE ENCOUNTER
Prescription approved per [facility] Refill Protocol.  Radha Gaytan RN

## 2018-11-13 ENCOUNTER — TELEPHONE (OUTPATIENT)
Dept: FAMILY MEDICINE | Facility: CLINIC | Age: 53
End: 2018-11-13

## 2018-11-13 NOTE — TELEPHONE ENCOUNTER
Type of outreach:  Phone, left message for patient to call back.   Health Maintenance Due   Topic Date Due     HIV SCREEN (SYSTEM ASSIGNED)  08/15/1982     HEPATITIS C SCREENING  08/15/1982     LIPID SCREEN Q5 YR MALE (SYSTEM ASSIGNED)  08/15/1999     TETANUS IMMUNIZATION (SYSTEM ASSIGNED)  01/01/2015     FIT Q1 YR  08/28/2017     INFLUENZA VACCINE (1) 09/01/2018     ANGELICA QUESTIONNAIRE 6 MONTHS  10/18/2018     Needs annual physical with anxiety recheck, turn in FIT test.  Amber Pritchett CMA ([facility])

## 2018-11-21 ENCOUNTER — OFFICE VISIT (OUTPATIENT)
Dept: FAMILY MEDICINE | Facility: CLINIC | Age: 53
End: 2018-11-21
Payer: COMMERCIAL

## 2018-11-21 VITALS
HEART RATE: 68 BPM | TEMPERATURE: 98 F | RESPIRATION RATE: 16 BRPM | SYSTOLIC BLOOD PRESSURE: 116 MMHG | WEIGHT: 167.4 LBS | OXYGEN SATURATION: 97 % | BODY MASS INDEX: 24.79 KG/M2 | DIASTOLIC BLOOD PRESSURE: 80 MMHG | HEIGHT: 69 IN

## 2018-11-21 DIAGNOSIS — L30.9 ECZEMA, UNSPECIFIED TYPE: ICD-10-CM

## 2018-11-21 DIAGNOSIS — L71.9 ROSACEA: ICD-10-CM

## 2018-11-21 DIAGNOSIS — F41.9 ANXIETY: ICD-10-CM

## 2018-11-21 DIAGNOSIS — Z12.11 SCREEN FOR COLON CANCER: ICD-10-CM

## 2018-11-21 DIAGNOSIS — G43.109 MIGRAINE WITH AURA AND WITHOUT STATUS MIGRAINOSUS, NOT INTRACTABLE: Primary | ICD-10-CM

## 2018-11-21 DIAGNOSIS — G47.9 SLEEP TROUBLE: ICD-10-CM

## 2018-11-21 PROCEDURE — 99214 OFFICE O/P EST MOD 30 MIN: CPT | Performed by: PHYSICIAN ASSISTANT

## 2018-11-21 RX ORDER — MINOCYCLINE HYDROCHLORIDE 50 MG/1
CAPSULE ORAL
Qty: 180 CAPSULE | Refills: 0 | Status: SHIPPED | OUTPATIENT
Start: 2018-11-21

## 2018-11-21 RX ORDER — SUMATRIPTAN 50 MG/1
50 TABLET, FILM COATED ORAL
Qty: 9 TABLET | Refills: 1 | Status: CANCELLED | OUTPATIENT
Start: 2018-11-21

## 2018-11-21 RX ORDER — ZOLPIDEM TARTRATE 5 MG/1
5 TABLET ORAL
Qty: 30 TABLET | Refills: 1 | Status: SHIPPED | OUTPATIENT
Start: 2018-11-21

## 2018-11-21 RX ORDER — HYDROXYZINE PAMOATE 25 MG/1
CAPSULE ORAL
Qty: 90 CAPSULE | Refills: 1 | Status: SHIPPED | OUTPATIENT
Start: 2018-11-21

## 2018-11-21 RX ORDER — SUMATRIPTAN 100 MG/1
100 TABLET, FILM COATED ORAL
Qty: 9 TABLET | Refills: 1 | Status: SHIPPED | OUTPATIENT
Start: 2018-11-21

## 2018-11-21 RX ORDER — CLOBETASOL PROPIONATE 0.5 MG/G
CREAM TOPICAL
Qty: 30 G | Refills: 0 | Status: SHIPPED | OUTPATIENT
Start: 2018-11-21

## 2018-11-21 RX ORDER — HYDROXYZINE PAMOATE 25 MG/1
CAPSULE ORAL
Qty: 30 CAPSULE | Refills: 1 | Status: SHIPPED | OUTPATIENT
Start: 2018-11-21 | End: 2018-11-21

## 2018-11-21 RX ORDER — TOPIRAMATE 25 MG/1
TABLET, FILM COATED ORAL
Qty: 70 TABLET | Refills: 0 | Status: SHIPPED | OUTPATIENT
Start: 2018-11-21 | End: 2018-12-19

## 2018-11-21 NOTE — PROGRESS NOTES
SUBJECTIVE:   Lalit Salinas is a 53 year old male who presents to clinic today for the following health issues:      1. Migraine Follow-Up  Last seen 4/18/18    Headaches symptoms:  Worsened due to getting them more often    Frequency: once every couple weeks     Duration of headaches: depends on how meds work, sometimes only lasts a couple hours sometimes it lasts a couple days    Able to do normal daily activities/work with migraines: Yes    Rescue/Relief medication:sumatriptan (Imitrex)              Effectiveness: total relief as long as it is taken promptly    Preventative medication: None    Neurologic complications: No new stroke-like symptoms, loss of vision or speech, numbness or weakness    In the past 4 weeks, how often have you gone to Urgent Care or the emergency room because of your headaches?  0      Amount of exercise or physical activity: None    But has an active job    Problems taking medications regularly: No    Medication side effects: none    Diet: regular (no restrictions)    Patient is here today to discuss his migraines  Feels like he is back to getting them more frequent, almost low grade daily headache and then severe headache every few weeks  Takes Imitrex at onset, and sometimes it works    2. Medication Followup of Hydroxyzine    Taking Medication as prescribed: yes, prn    Side Effects:  Feels very drowsy, even the next day    Medication Helping Symptoms:  Yes, but feels it could help more    He notes he is taking the medication for a few days in a row  But then some days doesn't need anything  Not interested in daily medication     3. Dry Skin  Patient is also concerned about skin on palm of right hand  Skin is extremely dry throughout the year but does get worse in the winter, tends to break and split when bad, denies pain  Has been given a strong steroid cream which did help  Ran out of it and never got a refill      Problem list and histories reviewed & adjusted, as  indicated.  Additional history: as documented    Patient Active Problem List   Diagnosis     CARDIOVASCULAR SCREENING; LDL GOAL LESS THAN 160     Migraine with aura and without status migrainosus, not intractable     Rosacea     Anxiety     History reviewed. No pertinent surgical history.    Social History   Substance Use Topics     Smoking status: Former Smoker     Smokeless tobacco: Never Used     Alcohol use Yes      Comment: occ     Family History   Problem Relation Age of Onset     Hypertension Mother      Other Cancer Father          Current Outpatient Prescriptions   Medication Sig Dispense Refill     clobetasol (TEMOVATE) 0.05 % cream Apply sparingly to affected area twice daily for 14 days.  Do not apply to face. 30 g 0     hydrOXYzine (VISTARIL) 25 MG capsule TAKE 1 CAPSULE(25 MG) BY MOUTH THREE TIMES DAILY AS NEEDED FOR ANXIETY 90 capsule 1     minocycline (MINOCIN/DYNACIN) 50 MG capsule TAKE 1 CAPSULE(50 MG) BY MOUTH TWICE DAILY 180 capsule 0     SUMAtriptan (IMITREX) 100 MG tablet Take 1 tablet (100 mg) by mouth at onset of headache for migraine May repeat in 2 hours. Max 2 tablets/24 hours. 9 tablet 1     SUMAtriptan (IMITREX) 50 MG tablet Take 1 tablet (50 mg) by mouth at onset of headache for migraine May repeat in 2 hours. Max 4 tablets/24 hours. 9 tablet 1     topiramate (TOPAMAX) 25 MG tablet Take 1 tablet (25 mg) at bedtime for 1 week, then 1 tablet twice daily for 1 week, then 1 tablet in AM and 2 in PM for 1 week, then 2 tablets twice daily. 70 tablet 0     zolpidem (AMBIEN) 5 MG tablet Take 1 tablet (5 mg) by mouth nightly as needed 30 tablet 1     Allergies   Allergen Reactions     Seasonal Allergies        Reviewed and updated as needed this visit by clinical staff  Tobacco  Allergies  Meds  Med Hx  Surg Hx  Fam Hx  Soc Hx      Reviewed and updated as needed this visit by Provider         ROS:  Constitutional, HEENT, cardiovascular, pulmonary, gi and gu systems are negative, except as  "otherwise noted.    OBJECTIVE:     /80 (BP Location: Right arm, Patient Position: Chair, Cuff Size: Adult Regular)  Pulse 68  Temp 98  F (36.7  C) (Oral)  Resp 16  Ht 5' 8.5\" (1.74 m)  Wt 167 lb 6.4 oz (75.9 kg)  SpO2 97%  BMI 25.08 kg/m2  Body mass index is 25.08 kg/(m^2).  GENERAL: healthy, alert and no distress  NECK: no adenopathy, no asymmetry, masses, or scars and thyroid normal to palpation  RESP: lungs clear to auscultation - no rales, rhonchi or wheezes  CV: regular rate and rhythm, normal S1 S2, no S3 or S4, no murmur, click or rub, no peripheral edema and peripheral pulses strong  MS: no gross musculoskeletal defects noted, no edema  SKIN: skin on anterior right palm is dry and cracked    Diagnostic Test Results:  none     ASSESSMENT/PLAN:             1. Migraine with aura and without status migrainosus, not intractable  Chronic issue, will increase dose of Sumatriptan for as needed headaches.  Will also add daily Topamax to see if frequency of headaches improves.  - topiramate (TOPAMAX) 25 MG tablet; Take 1 tablet (25 mg) at bedtime for 1 week, then 1 tablet twice daily for 1 week, then 1 tablet in AM and 2 in PM for 1 week, then 2 tablets twice daily.  Dispense: 70 tablet; Refill: 0  - SUMAtriptan (IMITREX) 100 MG tablet; Take 1 tablet (100 mg) by mouth at onset of headache for migraine May repeat in 2 hours. Max 2 tablets/24 hours.  Dispense: 9 tablet; Refill: 1    2. Anxiety  Chronic issue, does not need daily serotonin specific reuptake inhibitor, will refill Vistaril.  - hydrOXYzine (VISTARIL) 25 MG capsule; TAKE 1 CAPSULE(25 MG) BY MOUTH THREE TIMES DAILY AS NEEDED FOR ANXIETY  Dispense: 90 capsule; Refill: 1    3. Rosacea  - minocycline (MINOCIN/DYNACIN) 50 MG capsule; TAKE 1 CAPSULE(50 MG) BY MOUTH TWICE DAILY  Dispense: 180 capsule; Refill: 0    4. Sleep trouble  - zolpidem (AMBIEN) 5 MG tablet; Take 1 tablet (5 mg) by mouth nightly as needed  Dispense: 30 tablet; Refill: 1    5. " Eczema, unspecified type  Chronic issue, recently worsening, will trial steroid topical.  - clobetasol (TEMOVATE) 0.05 % cream; Apply sparingly to affected area twice daily for 14 days.  Do not apply to face.  Dispense: 30 g; Refill: 0    6. Screen for colon cancer  - Fecal colorectal cancer screen (FIT); Future    Risks, benefits and alternatives were discussed with patient. Agreeable to the plan of care.      Rosalva Mccall PA-C  [facility]

## 2018-11-21 NOTE — MR AVS SNAPSHOT
"              After Visit Summary   11/21/2018    Lalit Salinas    MRN: [id number]           Patient Information     Date Of Birth          1965        Visit Information        Provider Department      11/21/2018 3:10 PM Rosalva Mccall PA-C [facility]        Today's Diagnoses     Migraine with aura and without status migrainosus, not intractable    -  1    Rosacea        Sleep trouble        Screen for colon cancer        Anxiety        Eczema, unspecified type           Follow-ups after your visit        Follow-up notes from your care team     Return in about 6 months (around 5/21/2019) for Physical Exam.      Future tests that were ordered for you today     Open Future Orders        Priority Expected Expires Ordered    Fecal colorectal cancer screen (FIT) Routine 12/12/2018 2/13/2019 11/21/2018            Who to contact     If you have questions or need follow up information about today's clinic visit or your schedule please contact [facility] directly at [phone].  Normal or non-critical lab and imaging results will be communicated to you by MyChart, letter or phone within 4 business days after the clinic has received the results. If you do not hear from us within 7 days, please contact the clinic through MyChart or phone. If you have a critical or abnormal lab result, we will notify you by phone as soon as possible.  Submit refill requests through [employer] or call your pharmacy and they will forward the refill request to us. Please allow 3 business days for your refill to be completed.          Additional Information About Your Visit        Care EveryWhere ID     This is your Care EveryWhere ID. This could be used by other organizations to access your [place] medical records  [id number]        Your Vitals Were     Pulse Temperature Respirations Height Pulse Oximetry BMI (Body Mass Index)    68 98  F (36.7  C) (Oral) 16 5' 8.5\" (1.74 m) 97% 25.08 kg/m2    "    Blood Pressure from Last 3 Encounters:   11/21/18 116/80   04/18/18 136/82   06/28/17 110/78    Weight from Last 3 Encounters:   11/21/18 167 lb 6.4 oz (75.9 kg)   04/18/18 170 lb 11.2 oz (77.4 kg)   06/28/17 171 lb 3 oz (77.7 kg)                 Today's Medication Changes          These changes are accurate as of 11/21/18  3:27 PM.  If you have any questions, ask your nurse or doctor.               Start taking these medicines.        Dose/Directions    clobetasol 0.05 % cream   Commonly known as:  TEMOVATE   Used for:  Eczema, unspecified type   Started by:  Rosalva Mccall PA-C        Apply sparingly to affected area twice daily for 14 days.  Do not apply to face.   Quantity:  30 g   Refills:  0       hydrOXYzine 25 MG capsule   Commonly known as:  VISTARIL   Used for:  Anxiety   Started by:  Rosalva Mccall PA-C        TAKE 1 CAPSULE(25 MG) BY MOUTH THREE TIMES DAILY AS NEEDED FOR ANXIETY   Quantity:  90 capsule   Refills:  1       topiramate 25 MG tablet   Commonly known as:  TOPAMAX   Used for:  Migraine with aura and without status migrainosus, not intractable   Started by:  Rosalva Mccall PA-C        Take 1 tablet (25 mg) at bedtime for 1 week, then 1 tablet twice daily for 1 week, then 1 tablet in AM and 2 in PM for 1 week, then 2 tablets twice daily.   Quantity:  70 tablet   Refills:  0         These medicines have changed or have updated prescriptions.        Dose/Directions    * SUMAtriptan 50 MG tablet   Commonly known as:  IMITREX   This may have changed:  Another medication with the same name was added. Make sure you understand how and when to take each.   Used for:  Migraine with aura and without status migrainosus, not intractable   Changed by:  Rosalva Mccall PA-C        Dose:  50 mg   Take 1 tablet (50 mg) by mouth at onset of headache for migraine May repeat in 2 hours. Max 4 tablets/24 hours.   Quantity:  9 tablet   Refills:  1       * SUMAtriptan 100  MG tablet   Commonly known as:  IMITREX   This may have changed:  You were already taking a medication with the same name, and this prescription was added. Make sure you understand how and when to take each.   Used for:  Migraine with aura and without status migrainosus, not intractable   Changed by:  Rosalva Mccall PA-C        Dose:  100 mg   Take 1 tablet (100 mg) by mouth at onset of headache for migraine May repeat in 2 hours. Max 2 tablets/24 hours.   Quantity:  9 tablet   Refills:  1       * Notice:  This list has 2 medication(s) that are the same as other medications prescribed for you. Read the directions carefully, and ask your doctor or other care provider to review them with you.         Where to get your medicines      These medications were sent to [facility] [address] [address] [facility] [address] [address] [place] [address] & [facility]  [address] [address] [address]     Phone:  [phone]     clobetasol 0.05 % cream    hydrOXYzine 25 MG capsule    minocycline 50 MG capsule    SUMAtriptan 100 MG tablet         Some of these will need a paper prescription and others can be bought over the counter.  Ask your nurse if you have questions.     Bring a paper prescription for each of these medications     topiramate 25 MG tablet    zolpidem 5 MG tablet                Primary Care Provider Office Phone # Fax #    Rosalva Mccall PA-C [phone] [address]  formerly [facility] [address]        Equal Access to Services     VALENTE GARRETT AH: Hadii deuce ku hadasho Soomaali, waaxda luqadaha, qaybta kaalmada adeegyada, jj hu. So [facility] [phone].    ATENCIÓN: Si habla español, tiene a monge disposición servicios gratuitos de asistencia lingüística. Llame al [phone].    We comply with applicable federal civil rights laws and [place] laws. We do not discriminate on the basis of race, color, national origin, age,  disability, sex, sexual orientation, or gender identity.            Thank you!     Thank you for choosing [facility]  for your care. Our goal is always to provide you with excellent care. Hearing back from our patients is one way we can continue to improve our services. Please take a few minutes to complete the written survey that you may receive in the mail after your visit with us. Thank you!             Your Updated Medication List - Protect others around you: Learn how to safely use, store and throw away your medicines at www.disposemymeds.org.          This list is accurate as of 11/21/18  3:27 PM.  Always use your most recent med list.                   Brand Name Dispense Instructions for use Diagnosis    clobetasol 0.05 % cream    TEMOVATE    30 g    Apply sparingly to affected area twice daily for 14 days.  Do not apply to face.    Eczema, unspecified type       hydrOXYzine 25 MG capsule    VISTARIL    90 capsule    TAKE 1 CAPSULE(25 MG) BY MOUTH THREE TIMES DAILY AS NEEDED FOR ANXIETY    Anxiety       minocycline 50 MG capsule    MINOCIN/DYNACIN    180 capsule    TAKE 1 CAPSULE(50 MG) BY MOUTH TWICE DAILY    Rosacea       * SUMAtriptan 50 MG tablet    IMITREX    9 tablet    Take 1 tablet (50 mg) by mouth at onset of headache for migraine May repeat in 2 hours. Max 4 tablets/24 hours.    Migraine with aura and without status migrainosus, not intractable       * SUMAtriptan 100 MG tablet    IMITREX    9 tablet    Take 1 tablet (100 mg) by mouth at onset of headache for migraine May repeat in 2 hours. Max 2 tablets/24 hours.    Migraine with aura and without status migrainosus, not intractable       topiramate 25 MG tablet    TOPAMAX    70 tablet    Take 1 tablet (25 mg) at bedtime for 1 week, then 1 tablet twice daily for 1 week, then 1 tablet in AM and 2 in PM for 1 week, then 2 tablets twice daily.    Migraine with aura and without status migrainosus, not intractable       zolpidem 5 MG  tablet    AMBIEN    30 tablet    Take 1 tablet (5 mg) by mouth nightly as needed    Sleep trouble       * Notice:  This list has 2 medication(s) that are the same as other medications prescribed for you. Read the directions carefully, and ask your doctor or other care provider to review them with you.

## 2018-12-15 DIAGNOSIS — G43.109 MIGRAINE WITH AURA AND WITHOUT STATUS MIGRAINOSUS, NOT INTRACTABLE: ICD-10-CM

## 2018-12-15 NOTE — LETTER
[facility]  [address] [address] [address] [phone]        December 31, 2019    Lalit Salinas  [address]  [place] [address]              Dear Lalit Salinas    This is to remind you that your Hgb A1C is due.    You may call our office at [phone]  to schedule an appointment.    Please disregard this notice if you have already had your labs drawn or made an appointment.        Sincerely,        Rosalva Mccall PA-C and [facility] lab staff

## 2018-12-17 NOTE — TELEPHONE ENCOUNTER
"Requested Prescriptions   Pending Prescriptions Disp Refills     topiramate (TOPAMAX) 25 MG tablet  Last Written Prescription Date:  11/21/18  Last Fill Quantity: 70,  # refills: 0   Last office visit: 11/21/2018 with prescribing provider:  Rosalva Mccall PA-C    Future Office Visit:     70 tablet 0     Sig: Take 1 tablet (25 mg) at bedtime for 1 week, then 1 tablet twice daily for 1 week, then 1 tablet in AM and 2 in PM for 1 week, then 2 tablets twice daily.    Anti-Seizure Meds Protocol  Failed - 12/17/2018  9:12 AM       Failed - Review Authorizing provider's last note.     Refer to last progress notes: confirm request is for original authorizing provider (cannot be through other providers).         Failed - Normal CBC on file in past 26 months    No lab results found.             Failed - Normal ALT or AST on file in past 26 months    No lab results found.  No lab results found.         Failed - Normal platelet count on file in past 26 months    No lab results found.           Passed - Recent (12 mo) or future (30 days) visit within the authorizing provider's specialty    Patient had office visit in the last 12 months or has a visit in the next 30 days with authorizing provider or within the authorizing provider's specialty.  See \"Patient Info\" tab in inbasket, or \"Choose Columns\" in Meds & Orders section of the refill encounter.                "

## 2018-12-19 RX ORDER — TOPIRAMATE 25 MG/1
50 TABLET, FILM COATED ORAL 2 TIMES DAILY
Qty: 180 TABLET | Refills: 1 | Status: SHIPPED | OUTPATIENT
Start: 2018-12-19

## 2018-12-19 NOTE — TELEPHONE ENCOUNTER
1st attempt, LM for patient to call back.  Please sign orders if OK, was only pended.  Needs non-fasting lab only.  Amber Pritchett CMA ([facility])

## 2018-12-19 NOTE — TELEPHONE ENCOUNTER
Routing refill request to provider for review/approval because:  Labs not current:  Needs cbc and alt - do you want him to get now or can he wait until his next appt?  Thanks!

## 2018-12-26 NOTE — TELEPHONE ENCOUNTER
2nd attempt, LM with spouse to have patient call.  Needs non-fasting lab only.  Amber Pritchett CMA ([facility])

## 2019-03-14 ENCOUNTER — TELEPHONE (OUTPATIENT)
Dept: FAMILY MEDICINE | Facility: CLINIC | Age: 54
End: 2019-03-14

## 2019-03-14 NOTE — LETTER
2019      Lalit Salinas  [address]  [place] [address]        Dear Mr. Lalit Salinas,    We care about your health and have reviewed your health plan including medical conditions, medications, and lab results.  Based on this review, it is recommended that you follow up regarding the following health topic(s):   Health Maintenance Due   Topic Date Due     PREVENTIVE CARE VISIT  1965     HIV SCREEN (SYSTEM ASSIGNED)  08/15/1982     HEPATITIS C SCREENING  08/15/1982     LIPID SCREEN Q5 YR MALE (SYSTEM ASSIGNED)  08/15/1999     DTAP/TDAP/TD IMMUNIZATION (1 - Tdap) 2005     ZOSTER IMMUNIZATION (1 of 2) 08/15/2014     FIT Q1 YR  2017     ANGELICA QUESTIONNAIRE 6 MONTHS  10/18/2018     PHQ-2 Q1 YR  2019     We recommend you take the following action(s):   -turn in your FIT test.  This was ordered for you on 18 and you should have received it from our laboratory at the clinic.  Please look at the expiration date to make sure the test has not  and turn it in at your earliest opportunity.  If you need another test, please contact us to order one for you and you can pick it up at our lab.    -schedule an ANNUAL FASTING PHYSICAL EXAM.  This is important for total body health where the provider talks about recommended routine healthcare tips.  To be fasting, you should not eat anything for 10-12 hours ahead of time.  Please be sure to drink plenty of water and take all your normal medications as prescribed.  You can have one cup of black coffee (without cream or sugar), but please do not drink any juice or other liquids besides water.    The labs done at this office visit will ensure you can get your next refill of Topamax.      Please call us at the [facility] - [phone] to address the above recommendations.   Thank you for trusting [facility] and we appreciate the opportunity to serve you.    Sincerely,  Amber Pritchett CMA ([facility])

## 2019-03-14 NOTE — TELEPHONE ENCOUNTER
Type of outreach:  Phone, left message for patient to call back.   Health Maintenance Due   Topic Date Due     PREVENTIVE CARE VISIT  1965     HIV SCREEN (SYSTEM ASSIGNED)  08/15/1982     HEPATITIS C SCREENING  08/15/1982     LIPID SCREEN Q5 YR MALE (SYSTEM ASSIGNED)  08/15/1999     DTAP/TDAP/TD IMMUNIZATION (1 - Tdap) 01/02/2005     ZOSTER IMMUNIZATION (1 of 2) 08/15/2014     FIT Q1 YR  08/28/2017     ANGELICA QUESTIONNAIRE 6 MONTHS  10/18/2018     Needs to turn in FIT test, update ANGELICA/PHQ.  Also needs non-fasting labs per PCP, already ordered, needs to schedule lab only.  Amber Pritchett CMA ([facility])

## 2019-03-29 NOTE — TELEPHONE ENCOUNTER
3rd attempt, sent letter in mail.  Amber Pritchett CMA ([facility])

## 2022-12-10 NOTE — TELEPHONE ENCOUNTER
Routing refill request to provider for review/approval because:  Medication considered a trial for anxiety. Please advise of f/u plan.    Sivan DAMICO RN, BSN, PHN  [facility] RN         28.3